# Patient Record
Sex: FEMALE | Race: WHITE | URBAN - METROPOLITAN AREA
[De-identification: names, ages, dates, MRNs, and addresses within clinical notes are randomized per-mention and may not be internally consistent; named-entity substitution may affect disease eponyms.]

---

## 2024-10-04 ENCOUNTER — OFFICE VISIT (OUTPATIENT)
Dept: URBAN - METROPOLITAN AREA CLINIC 113 | Facility: CLINIC | Age: 59
End: 2024-10-04
Payer: SELF-PAY

## 2024-10-04 VITALS
BODY MASS INDEX: 35.85 KG/M2 | HEIGHT: 62 IN | TEMPERATURE: 98.1 F | HEART RATE: 73 BPM | WEIGHT: 194.8 LBS | SYSTOLIC BLOOD PRESSURE: 108 MMHG | DIASTOLIC BLOOD PRESSURE: 71 MMHG

## 2024-10-04 DIAGNOSIS — R94.8 ABNORMAL POSITRON EMISSION TOMOGRAPHY (PET) SCAN: ICD-10-CM

## 2024-10-04 DIAGNOSIS — R07.9 CHEST PAIN IN ADULT: ICD-10-CM

## 2024-10-04 PROCEDURE — 99204 OFFICE O/P NEW MOD 45 MIN: CPT

## 2024-10-04 RX ORDER — LISINOPRIL 10 MG/1
1 TABLET TABLET ORAL ONCE A DAY
Status: ACTIVE | COMMUNITY

## 2024-10-04 RX ORDER — LEVOTHYROXINE SODIUM 125 UG/1
1 TABLET IN THE MORNING ON AN EMPTY STOMACH TABLET ORAL ONCE A DAY
Status: ACTIVE | COMMUNITY

## 2024-10-04 RX ORDER — METFORMIN HYDROCHLORIDE 500 MG/1
1 TABLET WITH A MEAL TABLET, FILM COATED ORAL ONCE A DAY
Status: ACTIVE | COMMUNITY

## 2024-10-04 NOTE — HPI-TODAY'S VISIT:
Ms. Reveles is a 59-year-old female with past medical history of type 2 diabetes, GERD, mediastinal hilar lymphadenopathy (currently undergoing evaluation with Dr.Igor Hernandez) presenting for evaluation after abnormal PET scan showed  multifocal regions of hypermetabolic activity fusing to the rectum and colon.  A copy of today's visit will be forwarded to their office.  Labs available for review from 9/5/2024 show white blood cell count 5.5, hemoglobin 13.7, hematocrit 40.2, platelet count 169,000, elevated ALT 47, alkaline phosphatase 66.98, elevated AST 52, total bilirubin 0.8. She had a CT chest on 9/3/2024 which showed cortical hepatic nodularity.  Cirrhosis not excluded.,  PET scan on 10/1/2024 showed normal liver as well as hypermetabolic mediastinal hilar lymphadenopathy most concerning for malignancy such as lymphoma however other processes such as granulomatous disease/sarcoidosis or reactive adenopathy are also considered.  A diagnostic bronchoscopy was scheduled as she also had complaints of chest pain she was referred to cardiologist at her previous ER visit the patient had not seen cardiology yet.  Today she reports her bronchoscopy is 10/15. She has not seen cardiology yet, due to needing referral. She has never had a colonoscopy. Denies family history of colon cancer. She reports her bowels move overall regularly with intermittent constipation without blood per rectum or melena. Denies abdominal pain, nausea, vomiting, or fevers. Intermittent acid reflux.

## 2024-10-07 ENCOUNTER — DASHBOARD ENCOUNTERS (OUTPATIENT)
Age: 59
End: 2024-10-07

## 2024-10-07 ENCOUNTER — LAB OUTSIDE AN ENCOUNTER (OUTPATIENT)
Dept: URBAN - METROPOLITAN AREA CLINIC 113 | Facility: CLINIC | Age: 59
End: 2024-10-07